# Patient Record
Sex: MALE | Race: BLACK OR AFRICAN AMERICAN | NOT HISPANIC OR LATINO | URBAN - METROPOLITAN AREA
[De-identification: names, ages, dates, MRNs, and addresses within clinical notes are randomized per-mention and may not be internally consistent; named-entity substitution may affect disease eponyms.]

---

## 2023-09-14 ENCOUNTER — OFFICE VISIT (OUTPATIENT)
Dept: URGENT CARE | Facility: URGENT CARE | Age: 48
End: 2023-09-14

## 2023-09-14 VITALS
HEART RATE: 105 BPM | DIASTOLIC BLOOD PRESSURE: 88 MMHG | SYSTOLIC BLOOD PRESSURE: 158 MMHG | TEMPERATURE: 99.6 F | WEIGHT: 142.1 LBS | OXYGEN SATURATION: 96 %

## 2023-09-14 DIAGNOSIS — N50.89 SWELLING OF LEFT HALF OF SCROTUM: Primary | ICD-10-CM

## 2023-09-14 PROCEDURE — 99203 OFFICE O/P NEW LOW 30 MIN: CPT | Performed by: FAMILY MEDICINE

## 2023-09-14 NOTE — PROGRESS NOTES
(N50.89) Swelling of left half of scrotum  (primary encounter diagnosis)  Comment:     Patient swelling is noted.    Plan:   I have recommended that this patient presents to an ER.  He will likely need ultrasound and additional testing.  We do not have ultrasound available through urgent care.  I believe he will present to [facility] per his wishes.      CHIEF COMPLAINT    Pain and swelling in the scrotum.      HISTORY    This patient presents here with his wife.    He is describing pain and swelling in left side of scrotum over the last 3 days.  Wife pointed out that he also had some blood in his urine 1 day ago.        EXAM  BP (!) 158/88   Pulse 105   Temp 99.6  F (37.6  C) (Tympanic)   Wt 64.5 kg (142 lb 1.6 oz)   SpO2 96%     No obvious inguinal hernia.  Markedly swollen and tender left half of scrotum.